# Patient Record
Sex: MALE | HISPANIC OR LATINO | URBAN - METROPOLITAN AREA
[De-identification: names, ages, dates, MRNs, and addresses within clinical notes are randomized per-mention and may not be internally consistent; named-entity substitution may affect disease eponyms.]

---

## 2021-12-05 ENCOUNTER — EMERGENCY (EMERGENCY)
Facility: HOSPITAL | Age: 31
LOS: 1 days | Discharge: HOME | End: 2021-12-05
Attending: EMERGENCY MEDICINE | Admitting: EMERGENCY MEDICINE
Payer: SELF-PAY

## 2021-12-05 VITALS
OXYGEN SATURATION: 99 % | WEIGHT: 162.26 LBS | TEMPERATURE: 99 F | SYSTOLIC BLOOD PRESSURE: 117 MMHG | HEART RATE: 83 BPM | RESPIRATION RATE: 22 BRPM | DIASTOLIC BLOOD PRESSURE: 77 MMHG | HEIGHT: 65 IN

## 2021-12-05 PROCEDURE — 12001 RPR S/N/AX/GEN/TRNK 2.5CM/<: CPT | Mod: XU

## 2021-12-05 PROCEDURE — 99283 EMERGENCY DEPT VISIT LOW MDM: CPT | Mod: 25

## 2021-12-05 PROCEDURE — 64450 NJX AA&/STRD OTHER PN/BRANCH: CPT | Mod: F6

## 2021-12-05 PROCEDURE — 99283 EMERGENCY DEPT VISIT LOW MDM: CPT

## 2021-12-05 NOTE — ED PROCEDURE NOTE - ATTENDING CONTRIBUTION TO CARE
I was available for key portions of the procedure and agree with above
I was available for key portions of the procedure and agree with above

## 2021-12-05 NOTE — ED PROVIDER NOTE - PATIENT PORTAL LINK FT
You can access the FollowMyHealth Patient Portal offered by [facility] by registering at the following website: [email or web address]. By joining [facility]’s FollowMyHealth portal, you will also be able to view your health information using other applications (apps) compatible with our system.

## 2021-12-05 NOTE — ED PROVIDER NOTE - OBJECTIVE STATEMENT
30 y/o M with no PMH presents to ED for c/o R index finger injury. States was cutting onions when he accidentally cut finger with knife. This occured about 1 hour riot to arrival. TDAP up to date states he received vaccine 2 years ago. Denies any other injury or trauma Denies numbness and tingling, only repots pain and bleeding.

## 2021-12-05 NOTE — ED PROVIDER NOTE - ATTENDING CONTRIBUTION TO CARE
30 y/o M with no PMH presents to ED for c/o R index finger injury. States was cutting onions when he accidentally cut finger with knife. This occured about 1 hour riot to arrival. TDAP up to date states he received vaccine 2 years ago. Denies any other injury or trauma Denies numbness and tingling, only repots pain and bleeding. avulsion injury noted to distal tip of dorsal aspect of R index finger from mid nail distally with moderate amount of bleeding noted with exposed nail bed pt is a 32 yo male who presents to the ED with a cc of right hand injury. Pt with no significant past mdical history. Reports that he was cutting an onion when he accidently cut his right index finger with a knee. Incident occurred approx 1 hour PTA. Tetanus is UTD Denies additional injury. Denies numbnes or tingling to hand. Does report mild pain and bleeding which is controlled with direct pressure. On exam pt sitting in bed NAD, Right hand 2nd digit avulsion injury noted to distal tip of dorsal aspect from mid nail distally with moderate amount of bleeding noted with exposed nail bed proximal nail remains in matrix. Sensation grossly intact, cap refill less then 2 seconds +radial pulse. No enzo TTP FROM of digit no exposed bone noted. Pt presenting with avulsion injury to nail bed NVI no enzo TTP FROM of digit. Pt advised nail may not grow back. Will clean and repair wound

## 2021-12-05 NOTE — ED PROVIDER NOTE - CARE PROVIDER_API CALL
Kalen Carrington)  Plastic Surgery  [address], [address]  [place] [address]  Phone: [phone]  Fax: [phone]  Follow Up Time: 1-3 Days

## 2021-12-05 NOTE — ED ADULT NURSE NOTE - NSIMPLEMENTINTERV_GEN_ALL_ED
Implemented All Universal Safety Interventions:  [place] to call system. Call bell, personal items and telephone within reach. Instruct patient to call for assistance. Room bathroom lighting operational. Non-slip footwear when patient is off stretcher. Physically safe environment: no spills, clutter or unnecessary equipment. Stretcher in lowest position, wheels locked, appropriate side rails in place.

## 2021-12-05 NOTE — ED ADULT NURSE NOTE - OBJECTIVE STATEMENT
patient came in ED from [facility] with c/o avulsed tip of right index finger from cutting onion X 1 hour PTA.

## 2021-12-05 NOTE — ED PROVIDER NOTE - PHYSICAL EXAMINATION
PE:   GEN: Awake, alert, interactive, NAD, non-toxic appearing.   HEAD: Atraumatic  EYES: Sclera white, conjunctiva pink, PERRLA  NEURO: AOx3, CN II-XII grossly intact without focal deficit.   MSK: Moving all extremities with no apparent deformities.  sensation intact to distal R index finger   SKIN: avulsion injury noted to distal tip of dorsal aspect of R index finger from mid nail distally with moderate amount of bleeding noted with exposed davon bed

## 2021-12-05 NOTE — ED PROCEDURE NOTE - CPROC ED TIME OUT STATEMENT1
“Patient's name, , procedure and correct site were confirmed during the [place] Timeout.”
“Patient's name, , procedure and correct site were confirmed during the [place] Timeout.”

## 2021-12-05 NOTE — ED PROVIDER NOTE - NSFOLLOWUPINSTRUCTIONS_ED_ALL_ED_FT
1. KEEP DRESSING JEREMY WAN AND DRY. DO NOT REMOVE DRESSING.   FOR PAIN YOU CAN TAKE IBUPROFEN (MOTRIN, ADVIL) OR ACETAMINOPHEN (TYLENOL) AS NEEDED, AS DIRECTED ON PACKAGING.  2. YOU HAVE STITCHES WHICH WILL DISSOLVE ON THEIR OWN. A MEDICTAED DRESSING WAS APPLIED THAT WILL DISSOLVE ON ITS OWN. DO NOT GET DRESSING WET. FOLLOW UP WITH ____HAND SPECIALIST______ AS DIRECTED.    3. IF NEEDED, CALL [phone] TO FIND A PRIMARY CARE PHYSICIAN.  OR CALL [phone] TO MAKE AN APPOINTMENT WITH THE MEDICAL CLINIC.  4. RETURN TO THE ER FOR ANY WORSENING SYMPTOMS.    Laceration    A laceration is a cut that goes through all of the layers of the skin and into the tissue that is right under the skin. Some lacerations heal on their own. Others need to be closed with skin adhesive strips, skin glue, stitches (sutures), or staples. Proper laceration care minimizes the risk of infection and helps the laceration to heal better.  If non-absorbable stitches or staples have been placed, they must be taken out within the time frame instructed by your healthcare provider.    SEEK IMMEDIATE MEDICAL CARE IF YOU HAVE ANY OF THE FOLLOWING SYMPTOMS: swelling around the wound, worsening pain, drainage from the wound, red streaking going away from your wound, inability to move finger or toe near the laceration, or discoloration of skin near the laceration.

## 2022-07-01 NOTE — ED ADULT NURSE NOTE - ISOLATION TYPE:
What Type Of Note Output Would You Prefer (Optional)?: Bullet Format How Severe Is Your Rash?: mild Is This A New Presentation, Or A Follow-Up?: Rash None